# Patient Record
Sex: MALE | Race: WHITE | NOT HISPANIC OR LATINO | Employment: FULL TIME | URBAN - NONMETROPOLITAN AREA
[De-identification: names, ages, dates, MRNs, and addresses within clinical notes are randomized per-mention and may not be internally consistent; named-entity substitution may affect disease eponyms.]

---

## 2024-01-04 ENCOUNTER — OFFICE VISIT (OUTPATIENT)
Dept: PRIMARY CARE | Facility: CLINIC | Age: 24
End: 2024-01-04
Payer: COMMERCIAL

## 2024-01-04 VITALS
BODY MASS INDEX: 33.38 KG/M2 | SYSTOLIC BLOOD PRESSURE: 140 MMHG | OXYGEN SATURATION: 99 % | HEART RATE: 82 BPM | DIASTOLIC BLOOD PRESSURE: 80 MMHG | WEIGHT: 238.4 LBS | HEIGHT: 71 IN

## 2024-01-04 DIAGNOSIS — Z23 NEED FOR PROPHYLACTIC VACCINATION AGAINST DIPHTHERIA AND TETANUS: ICD-10-CM

## 2024-01-04 DIAGNOSIS — Z82.49 FAMILY HISTORY OF ESSENTIAL HYPERTENSION: Primary | ICD-10-CM

## 2024-01-04 PROCEDURE — 99203 OFFICE O/P NEW LOW 30 MIN: CPT | Performed by: STUDENT IN AN ORGANIZED HEALTH CARE EDUCATION/TRAINING PROGRAM

## 2024-01-04 PROCEDURE — 1036F TOBACCO NON-USER: CPT | Performed by: STUDENT IN AN ORGANIZED HEALTH CARE EDUCATION/TRAINING PROGRAM

## 2024-01-04 PROCEDURE — 90471 IMMUNIZATION ADMIN: CPT | Performed by: STUDENT IN AN ORGANIZED HEALTH CARE EDUCATION/TRAINING PROGRAM

## 2024-01-04 PROCEDURE — 90715 TDAP VACCINE 7 YRS/> IM: CPT | Performed by: STUDENT IN AN ORGANIZED HEALTH CARE EDUCATION/TRAINING PROGRAM

## 2024-01-04 ASSESSMENT — PATIENT HEALTH QUESTIONNAIRE - PHQ9
SUM OF ALL RESPONSES TO PHQ9 QUESTIONS 1 AND 2: 0
2. FEELING DOWN, DEPRESSED OR HOPELESS: NOT AT ALL
1. LITTLE INTEREST OR PLEASURE IN DOING THINGS: NOT AT ALL

## 2024-01-04 NOTE — PROGRESS NOTES
Subjective:  Avelino Esquivel is a 23 y.o. male who presents to clinic today for Establish Care (Mole on right side chest )      Significant family hx of hypertension  - all immediate family members  - he is active at both work and hobbies of hunting  [place] need [place] pack in 80 lbs 7miles    Having gerd from greasy foods and acidic foods. Worst with alcohol, pizza and red sauces.     Review of Systems    Assessment/Plan:  Avelino Esquivel is a 23 y.o. male with no significant family history who presents to clinic today to address the following issues:   1. Family history of essential hypertension  Blood pressure monitor      2. Need for prophylactic vaccination against diphtheria and tetanus  Tdap vaccine, age 7 years and older  (BOOSTRIX)        Family hx of HTN:  Repeat BP in office was 130/80x2. Discussed lifestyle modifications extensively including diet and exercise. He will work to increase vegetables and will take BP at home. If increasing will return to the office. He will also work to lose some weight.  - ordered BP cuff    GERD:  - Chronic problem, unresolved, new to this provider, requires further workup and treatment   - Discussed with pt lifestyle modifications and over the counter therapies    Also discussed tick borne illness prevention and treatment if he does get bit.    Problem List Items Addressed This Visit    None  Visit Diagnoses       Family history of essential hypertension    -  Primary    Relevant Orders    Blood pressure monitor    Need for prophylactic vaccination against diphtheria and tetanus        Relevant Orders    Tdap vaccine, age 7 years and older  (BOOSTRIX)            Patient Instructions   Blood pressure cuff, if BP is >140/85 consistently come back in for medication.  Lose 5-15lbs, increase your vegetable intake  If you have a tick that has been on for 36 hours or more call us as soon as possible and we can start doxycycline within 72  "hours.    Follow up: 6 months    Return precautions discussed.  An After Visit Summary was given to the patient.  All questions were answered and patient in agreement with plan.    Objective:  /80   Pulse 82   Ht 1.791 m (5' 10.5\")   Wt 108 kg (238 lb 6.4 oz)   SpO2 99%   BMI 33.72 kg/m²     Physical Exam  Vitals and nursing note reviewed.   Constitutional:       General: He is not in acute distress.     Appearance: Normal appearance. He is not ill-appearing.   HENT:      Head: Normocephalic and atraumatic.      Right Ear: There is no impacted cerumen.      Left Ear: Ear canal normal. There is no impacted cerumen.      Mouth/Throat:      Mouth: Mucous membranes are moist.   Eyes:      General: No scleral icterus.        Right eye: No discharge.         Left eye: No discharge.      Extraocular Movements: Extraocular movements intact.      Conjunctiva/sclera: Conjunctivae normal.      Pupils: Pupils are equal, round, and reactive to light.   Cardiovascular:      Rate and Rhythm: Normal rate and regular rhythm.   Pulmonary:      Effort: Pulmonary effort is normal.      Breath sounds: Normal breath sounds.   Skin:     General: Skin is warm and dry.      Comments: Multiple moles present on trunk, no concerning lesions present   Neurological:      General: No focal deficit present.      Mental Status: He is alert and oriented to person, place, and time.      Sensory: No sensory deficit.      Motor: No weakness.         I spent 23 minutes in total time for this visit including all related clinical activities before, during, and after the visit excluding other billable activities/procedure time.     Lana Glynn MD    "

## 2024-01-04 NOTE — PATIENT INSTRUCTIONS
Blood pressure cuff, if BP is >140/85 consistently come back in for medication.  Lose 5-15lbs, increase your vegetable intake  If you have a tick that has been on for 36 hours or more call us as soon as possible and we can start doxycycline within 72 hours.

## 2024-07-05 NOTE — PROGRESS NOTES
"Subjective:  Avelino Esquivel is a 23 y.o. male who presents to clinic today for annual exam    Employment:  - What do you do for work or school? [employer]   - How is school/work going? Going well    Social History:  - Who lives with you at home? No one    (Provider to ask):    Pillars of health:  - Any issues with sleep? no  - How do you feel about your eating habits? \"I eat salads\", more fast food than it should be  - What do you do to be physically active? He walks, golfs and works And how often? regularly  - How is your stress level? high Manageable or is it a problem? manageable  - Any guns in the home? yes If yes, are they locked in a safe? no    Sexual history (provider to ask):  - Have you been sexually active within the past year? Yes   - What are the genders of your sexual partner(s)? female  - Are you or your spouse/partner wanting to become pregnant or have children in the next year? No  - If no, are you currently using any method to prevent pregnancy:? Yes   - Do you have a personal history of sexually transmitted infections (STI)?: Has never been diagnosed with an STI  - Have you ever been tested for HIV? no  - Do you want comprehensive STI testing today? No    Domestic Violence Screening (Provider to ask):  Because violence is so common in many people's lives and because there is help available for those being abused, I now ask every patient about domestic violence:  - Within the past year have you been hit, slapped, kicked or otherwise physically hurt by someone? no  - Are you in a relationship with a person who threatens or physically hurts you? no  - Has anyone forced you to have sexual activities that made you feel uncomfortable? no    Review of Systems    Assessment/Plan:  Avelino Esquivel is a 23 y.o. male who presents to clinic today to address the following issues:   1. Annual physical exam          Annual phsyical without abnormalities. Reviewed healthy lifestyle. " "Discussed sun protection.   He declined labwork.  I recommended that he gets his HPV vaccines. He will call insurance to confirm they are covered.      Patient Instructions   Routine adult health maintenance   It sounds as if things are going well. Keep up the good work!     Exercise:   - Try for at least 150 minutes of cardiovascular (strenuous) exercise per week.   Safety:   - Wear your seat belt at all times when in a car and NO TEXTING/CELL PHONES while driving.   - Wear a helmet while biking, using a motorcycle, skiing/snow boarding, skate/long boarding, or any activities faster than running.   - Avoid smoking.   - If you have a gun it needs to be locked up and stored unloaded away from children.   Nutrition:   - Drink plenty of water each day   - No more than 2 alcoholic drinks per day for men. No more than 1 alcoholic drink per day for woman.   - Read labels for calories   - Use website \"My Fitness Pal\" for free calorie counting tool when possible.   Stress:   - Make time for activities that allow you to decrease stress and recognize any red flags of stress for you to know when to activate your stress release mechanisms.   Sleep:   - Try to get 7-9 hours of sleep each night.   Preventative Care:   - Follow-up yearly for your annual exams   - For most people, the following are indicated:  - reproductive age females prenatal vitamin daily  - Colonoscopies for colon cancer screening starting at age 45, then every 3-10 years  - Mammograms for breast cancer screening every 1-2 years starting at age 40  - Annual flu vaccination   - Bone density screening at age 65   - Pneumonia vaccination at age 65 (some people need this before age 65, so ask your doctor!)  - Shingles vaccination (shingrix) at age 50     Follow up: 1 year or sooner as needed    Return precautions discussed.  An After Visit Summary was given to the patient.  All questions were answered and patient in agreement with plan.    Objective:  /76   " "Pulse 75   Ht 1.791 m (5' 10.5\")   Wt 107 kg (236 lb 9.6 oz)   SpO2 98%   BMI 33.47 kg/m²     Physical Exam  Vitals and nursing note reviewed.   Constitutional:       General: He is not in acute distress.     Appearance: Normal appearance. He is obese. He is not ill-appearing.   HENT:      Head: Normocephalic and atraumatic.      Right Ear: Tympanic membrane, ear canal and external ear normal. There is no impacted cerumen.      Left Ear: Tympanic membrane, ear canal and external ear normal. There is no impacted cerumen.      Nose: Nose normal. No congestion.      Mouth/Throat:      Mouth: Mucous membranes are moist.      Pharynx: No oropharyngeal exudate or posterior oropharyngeal erythema.   Eyes:      General: No scleral icterus.        Right eye: No discharge.         Left eye: No discharge.      Extraocular Movements: Extraocular movements intact.      Conjunctiva/sclera: Conjunctivae normal.      Pupils: Pupils are equal, round, and reactive to light.   Cardiovascular:      Rate and Rhythm: Normal rate and regular rhythm.   Pulmonary:      Effort: Pulmonary effort is normal. No respiratory distress.      Breath sounds: Normal breath sounds. No wheezing.   Abdominal:      General: Bowel sounds are normal. There is no distension.      Palpations: Abdomen is soft.      Tenderness: There is no abdominal tenderness.   Musculoskeletal:      Cervical back: Normal range of motion. No tenderness.      Right lower leg: No edema.      Left lower leg: No edema.   Lymphadenopathy:      Cervical: No cervical adenopathy.   Skin:     General: Skin is warm and dry.   Neurological:      General: No focal deficit present.      Mental Status: He is alert and oriented to person, place, and time.      Sensory: No sensory deficit.      Motor: No weakness.      Deep Tendon Reflexes: Reflexes normal.   Psychiatric:         Mood and Affect: Mood normal.         Behavior: Behavior normal.         Thought Content: Thought content " "normal.         Judgment: Judgment normal.         LABS:   No results found for: \"CHOL\", \"LDL\", \"HDL\", \"HGBA1C\"    The ASCVD Risk score (Juliette DK, et al., 2019) failed to calculate for the following reasons:    The 2019 ASCVD risk score is only valid for ages 40 to 79    IMAGES:   No new images     I spent 15 minutes in total time for this visit including all related clinical activities before, during, and after the visit excluding other billable activities/procedure time.     Lana Glynn MD    "

## 2024-07-08 ENCOUNTER — APPOINTMENT (OUTPATIENT)
Dept: PRIMARY CARE | Facility: CLINIC | Age: 24
End: 2024-07-08
Payer: COMMERCIAL

## 2024-07-08 VITALS
WEIGHT: 236.6 LBS | BODY MASS INDEX: 33.12 KG/M2 | DIASTOLIC BLOOD PRESSURE: 76 MMHG | OXYGEN SATURATION: 98 % | SYSTOLIC BLOOD PRESSURE: 136 MMHG | HEART RATE: 75 BPM | HEIGHT: 71 IN

## 2024-07-08 DIAGNOSIS — Z00.00 ANNUAL PHYSICAL EXAM: Primary | ICD-10-CM

## 2024-07-08 PROCEDURE — 1036F TOBACCO NON-USER: CPT | Performed by: STUDENT IN AN ORGANIZED HEALTH CARE EDUCATION/TRAINING PROGRAM

## 2024-07-08 PROCEDURE — 99395 PREV VISIT EST AGE 18-39: CPT | Performed by: STUDENT IN AN ORGANIZED HEALTH CARE EDUCATION/TRAINING PROGRAM

## 2024-07-08 ASSESSMENT — PATIENT HEALTH QUESTIONNAIRE - PHQ9
1. LITTLE INTEREST OR PLEASURE IN DOING THINGS: NOT AT ALL
SUM OF ALL RESPONSES TO PHQ9 QUESTIONS 1 & 2: 0
2. FEELING DOWN, DEPRESSED OR HOPELESS: NOT AT ALL

## 2024-07-08 ASSESSMENT — PROMIS GLOBAL HEALTH SCALE
RATE_AVERAGE_FATIGUE: MODERATE
RATE_QUALITY_OF_LIFE: VERY GOOD
CARRYOUT_PHYSICAL_ACTIVITIES: COMPLETELY
RATE_GENERAL_HEALTH: GOOD
RATE_AVERAGE_PAIN: 0
RATE_MENTAL_HEALTH: GOOD
RATE_PHYSICAL_HEALTH: GOOD
EMOTIONAL_PROBLEMS: SOMETIMES
CARRYOUT_SOCIAL_ACTIVITIES: GOOD
RATE_SOCIAL_SATISFACTION: GOOD

## 2025-07-10 ENCOUNTER — APPOINTMENT (OUTPATIENT)
Age: 25
End: 2025-07-10
Payer: COMMERCIAL

## 2025-07-10 VITALS
SYSTOLIC BLOOD PRESSURE: 120 MMHG | DIASTOLIC BLOOD PRESSURE: 80 MMHG | HEIGHT: 71 IN | BODY MASS INDEX: 33.29 KG/M2 | HEART RATE: 93 BPM | WEIGHT: 237.8 LBS | OXYGEN SATURATION: 98 %

## 2025-07-10 DIAGNOSIS — Z11.4 SCREENING FOR HUMAN IMMUNODEFICIENCY VIRUS: ICD-10-CM

## 2025-07-10 DIAGNOSIS — Z00.00 ANNUAL PHYSICAL EXAM: Primary | ICD-10-CM

## 2025-07-10 DIAGNOSIS — Z13.220 LIPID SCREENING: ICD-10-CM

## 2025-07-10 DIAGNOSIS — Z13.1 SCREENING FOR DIABETES MELLITUS: ICD-10-CM

## 2025-07-10 DIAGNOSIS — Z11.59 NEED FOR HEPATITIS C SCREENING TEST: ICD-10-CM

## 2025-07-10 PROCEDURE — 3008F BODY MASS INDEX DOCD: CPT | Performed by: STUDENT IN AN ORGANIZED HEALTH CARE EDUCATION/TRAINING PROGRAM

## 2025-07-10 PROCEDURE — 1036F TOBACCO NON-USER: CPT | Performed by: STUDENT IN AN ORGANIZED HEALTH CARE EDUCATION/TRAINING PROGRAM

## 2025-07-10 PROCEDURE — 99395 PREV VISIT EST AGE 18-39: CPT | Performed by: STUDENT IN AN ORGANIZED HEALTH CARE EDUCATION/TRAINING PROGRAM

## 2025-07-10 ASSESSMENT — PATIENT HEALTH QUESTIONNAIRE - PHQ9
1. LITTLE INTEREST OR PLEASURE IN DOING THINGS: NOT AT ALL
2. FEELING DOWN, DEPRESSED OR HOPELESS: NOT AT ALL
SUM OF ALL RESPONSES TO PHQ9 QUESTIONS 1 AND 2: 0

## 2025-07-10 NOTE — PATIENT INSTRUCTIONS
"Routine adult health maintenance   It sounds as if things are going well. Keep up the good work!     Exercise:   - Try for at least 150 minutes of cardiovascular (strenuous) exercise per week.   Safety:   - Wear your seat belt at all times when in a car and NO TEXTING/CELL PHONES while driving.   - Wear a helmet while biking, using a motorcycle, skiing/snow boarding, skate/long boarding, or any activities faster than running.   - Avoid smoking.   - If you have a gun it needs to be locked up and stored unloaded away from children.   Nutrition:   - Drink plenty of water each day   - No more than 2 alcoholic drinks per day for men. No more than 1 alcoholic drink per day for woman.   - Read labels for calories   - Use website \"My Fitness Pal\" for free calorie counting tool when possible.   Stress:   - Make time for activities that allow you to decrease stress and recognize any red flags of stress for you to know when to activate your stress release mechanisms.   Sleep:   - Try to get 7-9 hours of sleep each night.   Preventative Care:   - Follow-up yearly for your annual exams   - For most people, the following are indicated:  - reproductive age females prenatal vitamin daily  - Colonoscopies for colon cancer screening starting at age 45, then every 3-10 years  - Mammograms for breast cancer screening every 1-2 years starting at age 40  - Annual flu vaccination   - Bone density screening at age 65   - Pneumonia vaccination at age 50 (some people need this before age 65, so ask your doctor!)  - Shingles vaccination (shingrix) at age 50   "

## 2025-07-10 NOTE — PROGRESS NOTES
Subjective:  Avelino Esquivel is a 24 y.o. 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 Going well    Social History:  - Who lives with you at home? No one    Medical History:   (Please complete in history tab)  - Any changes to your personal or family history? no  - Any personal/family history of the following:   Hypertension, heart attack, high cholesterol, stroke  Cancer of the colon, breast, prostate or skin  Addiction, alcoholism, mental health concerns   Diabetes, thyroid or autoimmune disease    PHQ2-0    (Provider to ask):    Pillars of health:  - Any issues with sleep? no  - How do you feel about your eating habits? Okay, maybe improved  - What do you do to be physically active? Walking with a pack And how often? Most days  - How is your stress level? High due to work Manageable or is it a problem? manageable  - Any guns in the home? yes If yes, are they locked in a safe? yes      Sexual history (provider to ask):  - Have you been sexually active within the past year? No       Review of Systems    Assessment/Plan:  Avelino Esquivel is a 24 y.o. male  who presents to clinic today to address the following issues:   1. Annual physical exam        2. Lipid screening  Lipid Panel    Lipid Panel      3. Screening for diabetes mellitus  Basic metabolic panel    Basic metabolic panel      4. Screening for human immunodeficiency virus  HIV 1/2 Antigen/Antibody Screen with Reflex to Confirmation    HIV 1/2 Antigen/Antibody Screen with Reflex to Confirmation      5. Need for hepatitis C screening test  Hepatitis C antibody    Hepatitis C antibody        Annual physical exam within normal limits with exception of obesity.  Discussed lifestyle modifications to help improve this chronic problem.  Additionally discussed stress reduction.  Order screening labs as above.  Recommended patient to obtain HPV vaccine  "which she will obtain at the pharmacy.  No problem-specific Assessment & Plan notes found for this encounter.      Patient Instructions   Routine adult health maintenance   It sounds as if things are going well. Keep up the good work!     Exercise:   - Try for at least 150 minutes of cardiovascular (strenuous) exercise per week.   Safety:   - Wear your seat belt at all times when in a car and NO TEXTING/CELL PHONES while driving.   - Wear a helmet while biking, using a motorcycle, skiing/snow boarding, skate/long boarding, or any activities faster than running.   - Avoid smoking.   - If you have a gun it needs to be locked up and stored unloaded away from children.   Nutrition:   - Drink plenty of water each day   - No more than 2 alcoholic drinks per day for men. No more than 1 alcoholic drink per day for woman.   - Read labels for calories   - Use website \"My Fitness Pal\" for free calorie counting tool when possible.   Stress:   - Make time for activities that allow you to decrease stress and recognize any red flags of stress for you to know when to activate your stress release mechanisms.   Sleep:   - Try to get 7-9 hours of sleep each night.   Preventative Care:   - Follow-up yearly for your annual exams   - For most people, the following are indicated:  - reproductive age females prenatal vitamin daily  - Colonoscopies for colon cancer screening starting at age 45, then every 3-10 years  - Mammograms for breast cancer screening every 1-2 years starting at age 40  - Annual flu vaccination   - Bone density screening at age 65   - Pneumonia vaccination at age 50 (some people need this before age 65, so ask your doctor!)  - Shingles vaccination (shingrix) at age 50     Follow up: 1 year or sooner as needed    Return precautions discussed.  An After Visit Summary was given to the patient.  All questions were answered and patient in agreement with plan.    Objective:  /80   Pulse 93   Ht 1.791 m (5' 10.5\")   " Wt 108 kg (237 lb 12.8 oz)   SpO2 98%   BMI 33.64 kg/m²     Physical Exam  Vitals and nursing note reviewed.   Constitutional:       General: He is not in acute distress.     Appearance: Normal appearance. He is obese. He is not ill-appearing.   HENT:      Head: Normocephalic and atraumatic.      Right Ear: Tympanic membrane, ear canal and external ear normal. There is no impacted cerumen.      Left Ear: Tympanic membrane, ear canal and external ear normal. There is no impacted cerumen.      Nose: Nose normal. No congestion.      Mouth/Throat:      Mouth: Mucous membranes are moist.      Pharynx: No oropharyngeal exudate or posterior oropharyngeal erythema.   Eyes:      General: No scleral icterus.        Right eye: No discharge.         Left eye: No discharge.      Extraocular Movements: Extraocular movements intact.      Conjunctiva/sclera: Conjunctivae normal.      Pupils: Pupils are equal, round, and reactive to light.   Cardiovascular:      Rate and Rhythm: Normal rate and regular rhythm.   Pulmonary:      Effort: Pulmonary effort is normal. No respiratory distress.      Breath sounds: Normal breath sounds. No wheezing.   Abdominal:      General: Bowel sounds are normal. There is no distension.      Palpations: Abdomen is soft.      Tenderness: There is no abdominal tenderness.   Musculoskeletal:      Cervical back: Normal range of motion. No tenderness.      Right lower leg: No edema.      Left lower leg: No edema.   Lymphadenopathy:      Cervical: No cervical adenopathy.   Skin:     General: Skin is warm and dry.   Neurological:      General: No focal deficit present.      Mental Status: He is alert and oriented to person, place, and time.      Sensory: No sensory deficit.      Motor: No weakness.      Deep Tendon Reflexes: Reflexes normal.   Psychiatric:         Mood and Affect: Mood normal.         Behavior: Behavior normal.         Thought Content: Thought content normal.         Judgment: Judgment  "normal.         LABS:   No results found for: \"CHOL\", \"LDL\", \"HDL\", \"HGBA1C\"    The ASCVD Risk score (Juliette DK, et al., 2019) failed to calculate for the following reasons:    The 2019 ASCVD risk score is only valid for ages 40 to 79    IMAGES:   No new images     I spent 15 minutes in total time for this visit including all related clinical activities before, during, and after the visit excluding other billable activities/procedure time.     Lana Glynn MD    "

## 2026-07-13 ENCOUNTER — APPOINTMENT (OUTPATIENT)
Age: 26
End: 2026-07-13
Payer: COMMERCIAL